# Patient Record
(demographics unavailable — no encounter records)

---

## 2025-02-05 NOTE — HISTORY OF PRESENT ILLNESS
[de-identified] : Christie Rock is a 62 y/o female presenting with large lumbar scoliosis, multilevel spondylosis, multilevel disc herniations, and concomitant neuroforaminal stenosis. Accompanied by her .

## 2025-02-05 NOTE — HISTORY OF PRESENT ILLNESS
[de-identified] : Christie Rock is a 62 y/o female presenting with large lumbar scoliosis, multilevel spondylosis, multilevel disc herniations, and concomitant neuroforaminal stenosis. Accompanied by her .

## 2025-02-05 NOTE — ADDENDUM
[FreeTextEntry1] :  ICandi assisted in documentation on 02/04/2025 acting as a scribe for Dr. Rafael Jordan.

## 2025-02-05 NOTE — PLAN
[TextEntry] : Provider reluctant to consider a large reconstructive operation as the risks to benefits ratio is unfavorable.   Recommended that pt follow with Dr. Whitaker for translaminar epidural steroid injection.    Pt may consider spinal cord stimulator trial is symptoms prove recalcitrant.   Provider discouraged surgical interventions.

## 2025-02-05 NOTE — ADDENDUM
[FreeTextEntry1] :  ICanid assisted in documentation on 02/04/2025 acting as a scribe for Dr. Rafael Jordan.

## 2025-02-05 NOTE — HISTORY OF PRESENT ILLNESS
[de-identified] : Christie Rokc is a 64 y/o female presenting with large lumbar scoliosis, multilevel spondylosis, multilevel disc herniations, and concomitant neuroforaminal stenosis. Accompanied by her .

## 2025-02-05 NOTE — END OF VISIT
[FreeTextEntry3] : . All medical record entries made by my scribe were at my, Dr. Rafael Jordan, direction and personally dictated by me. I have reviewed the chart and agree that the record accurately reflects my personal performance of the history, physical exam, assessment and plan. I have also personally directed, reviewed, and agreed with the chart.